# Patient Record
Sex: FEMALE | Race: WHITE | NOT HISPANIC OR LATINO | Employment: FULL TIME | URBAN - METROPOLITAN AREA
[De-identification: names, ages, dates, MRNs, and addresses within clinical notes are randomized per-mention and may not be internally consistent; named-entity substitution may affect disease eponyms.]

---

## 2017-11-24 ENCOUNTER — TELEPHONE (OUTPATIENT)
Dept: FAMILY MEDICINE | Facility: CLINIC | Age: 57
End: 2017-11-24

## 2017-11-24 DIAGNOSIS — Z13.6 CARDIOVASCULAR SCREENING; LDL GOAL LESS THAN 100: ICD-10-CM

## 2017-11-24 DIAGNOSIS — Z13.6 CARDIOVASCULAR SCREENING; LDL GOAL LESS THAN 100: Primary | ICD-10-CM

## 2017-11-24 LAB
CHOLEST SERPL-MCNC: 234 MG/DL
HDLC SERPL-MCNC: 57 MG/DL
LDLC SERPL CALC-MCNC: ABNORMAL MG/DL
LDLC SERPL DIRECT ASSAY-MCNC: 113 MG/DL
NONHDLC SERPL-MCNC: 177 MG/DL
TRIGL SERPL-MCNC: 473 MG/DL

## 2017-11-24 PROCEDURE — 36415 COLL VENOUS BLD VENIPUNCTURE: CPT | Performed by: NURSE PRACTITIONER

## 2017-11-24 PROCEDURE — 83721 ASSAY OF BLOOD LIPOPROTEIN: CPT | Performed by: NURSE PRACTITIONER

## 2017-11-24 PROCEDURE — 80061 LIPID PANEL: CPT | Performed by: NURSE PRACTITIONER

## 2017-11-24 NOTE — TELEPHONE ENCOUNTER
Yodit is requesting an order for fasting cholesterol panel.  She will take the results of this cholesterol panel to her cardiology appointment next week.  She denies any other needs today. She denies the need for any other lab tests.
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
## 2018-02-19 NOTE — PROGRESS NOTES
Per Dr. Dulce Funk send in  Augmentin 875 mg BID x 5 days.  Meds called into the [facility] pharmacy.  Dionne Mike RN

## 2018-10-17 ENCOUNTER — TELEPHONE (OUTPATIENT)
Dept: FAMILY MEDICINE | Facility: CLINIC | Age: 58
End: 2018-10-17

## 2018-12-11 ENCOUNTER — OFFICE VISIT (OUTPATIENT)
Dept: FAMILY MEDICINE | Facility: CLINIC | Age: 58
End: 2018-12-11
Payer: COMMERCIAL

## 2018-12-11 VITALS — HEART RATE: 80 BPM | SYSTOLIC BLOOD PRESSURE: 130 MMHG | RESPIRATION RATE: 16 BRPM | DIASTOLIC BLOOD PRESSURE: 78 MMHG

## 2018-12-11 DIAGNOSIS — M54.41 ACUTE BILATERAL LOW BACK PAIN WITH RIGHT-SIDED SCIATICA: Primary | ICD-10-CM

## 2018-12-11 PROCEDURE — 99203 OFFICE O/P NEW LOW 30 MIN: CPT | Performed by: FAMILY MEDICINE

## 2018-12-11 RX ORDER — LEVOTHYROXINE SODIUM 112 UG/1
TABLET ORAL
Refills: 3 | COMMUNITY
Start: 2018-09-17

## 2018-12-11 RX ORDER — KETOROLAC TROMETHAMINE 30 MG/ML
30-60 INJECTION, SOLUTION INTRAMUSCULAR; INTRAVENOUS
COMMUNITY
Start: 2018-10-08

## 2018-12-11 RX ORDER — ATORVASTATIN CALCIUM 20 MG/1
TABLET, FILM COATED ORAL
Refills: 0 | COMMUNITY
Start: 2018-12-03

## 2018-12-11 RX ORDER — ONDANSETRON 8 MG/1
TABLET, ORALLY DISINTEGRATING ORAL
Refills: 0 | COMMUNITY
Start: 2017-12-18

## 2018-12-11 RX ORDER — BUPROPION HYDROCHLORIDE 150 MG/1
450 TABLET ORAL
COMMUNITY
Start: 2017-09-26

## 2018-12-11 RX ORDER — ZOLMITRIPTAN 5 MG/1
TABLET, FILM COATED ORAL
Refills: 7 | COMMUNITY
Start: 2018-11-16

## 2018-12-11 RX ORDER — FENOFIBRATE 54 MG/1
TABLET ORAL
Refills: 3 | COMMUNITY
Start: 2018-11-14

## 2018-12-11 RX ORDER — SYRINGE WITH NEEDLE, 1 ML 25GX5/8"
SYRINGE, EMPTY DISPOSABLE MISCELLANEOUS
Refills: 1 | COMMUNITY
Start: 2018-09-04

## 2018-12-11 RX ORDER — VENLAFAXINE HYDROCHLORIDE 150 MG/1
CAPSULE, EXTENDED RELEASE ORAL
Refills: 0 | COMMUNITY
Start: 2018-09-17

## 2018-12-11 RX ORDER — CYCLOBENZAPRINE HCL 10 MG
10 TABLET ORAL 3 TIMES DAILY PRN
Qty: 30 TABLET | Refills: 1 | Status: SHIPPED | OUTPATIENT
Start: 2018-12-11 | End: 2018-12-21

## 2018-12-11 RX ORDER — DIHYDROERGOTAMINE MESYLATE 1 MG/ML
1 INJECTION, SOLUTION INTRAMUSCULAR; INTRAVENOUS; SUBCUTANEOUS
COMMUNITY
End: 2021-03-04

## 2018-12-11 RX ORDER — TOPIRAMATE 100 MG/1
TABLET, FILM COATED ORAL
Refills: 6 | COMMUNITY
Start: 2018-11-14

## 2018-12-11 RX ORDER — ESTROGENS, CONJUGATED 0.9 MG
TABLET ORAL
Refills: 0 | COMMUNITY
Start: 2018-10-30 | End: 2021-03-04

## 2018-12-11 ASSESSMENT — PAIN SCALES - GENERAL: PAINLEVEL: MODERATE PAIN (4)

## 2018-12-11 NOTE — PROGRESS NOTES
"  SUBJECTIVE:   Yodit Abraham is a 58 year old female who presents to clinic today for the following health issues:    HPI     Presents with recurrent low back pain which comes and goes.  This past week overnight awoke having tweaked her back in her sleep with increased pain and spasm and radicular symptoms into her RIGHT glute.  No known injury or trauma.  She has not been exercising.  Hx of migraines-- worsened with flared back.  She has been using HEAT and NSAIDs for relief.  Would like referral to PT for core strengthening program.    Problem list and histories reviewed & adjusted, as indicated.  Additional history: as documented        There is no problem list on file for this patient.    History reviewed. No pertinent surgical history.    Social History     Tobacco Use     Smoking status: Never Smoker     Smokeless tobacco: Never Used   Substance Use Topics     Alcohol use: Not on file     History reviewed. No pertinent family history.      Current Outpatient Medications   Medication Sig Dispense Refill     atorvastatin (LIPITOR) 20 MG tablet TK 1 T PO QD  0     BD LUER-CHENTE SYRINGE 25G X 1-1/2\" 3 ML MISC FOR SELF INJECTION OF INTRAMUSCULAR DHE AND KETROLAC  1     buPROPion (WELLBUTRIN XL) 150 MG 24 hr tablet Take 450 mg by mouth       cholecalciferol (VITAMIN D-3 SUPER STRENGTH) 2000 units tablet Take 2,000 Units by mouth       cyclobenzaprine (FLEXERIL) 10 MG tablet Take 1 tablet (10 mg) by mouth 3 times daily as needed for muscle spasms 30 tablet 1     dihydroergotamine (DHE) 1 MG/ML injection Inject 1 mg into the muscle       fenofibrate (LOFIBRA) 54 MG tablet TK 1 T PO D  3     ketorolac (TORADOL) 60 MG/2ML injection Inject 30-60 mg into the muscle       levothyroxine (SYNTHROID/LEVOTHROID) 112 MCG tablet TK 1 T PO D  3     ondansetron (ZOFRAN-ODT) 8 MG ODT tab TK 1 T PO SUBLINGUALLY BID PRN  0     PREMARIN 0.9 MG tablet TAKE ONE T PO QD  0     Syringe Luer Slip (MONOJECT SYRINGE REG LUER) 3 ML MISC For self " "injection of IM DHE and Ketorolac. Needs 1 1/2\" 25G needles.       topiramate (TOPAMAX) 100 MG tablet TK 1 T PO D HS  6     tretinoin (RENOVA) 0.02 % external cream        venlafaxine (EFFEXOR-XR) 150 MG 24 hr capsule TK 1 C PO QD  0     ZOLMitriptan (ZOMIG) 5 MG tablet TK 1 T PO AT ONSET OF MIGRAINE. MAY REPEAT IN 2 H PRN FOR UP TO 2 TS IN 24 H AND MAX 4 DAYS PER MONTH  7     ATENOLOL 50 MG OR TABS 1 TABLET DAILY (Patient not taking: Reported on 12/11/2018) 30 5     No Known Allergies  Recent Labs   Lab Test 11/24/17  1134   LDL Cannot estimate LDL when triglyceride exceeds 400 mg/dL  113*   HDL 57   TRIG 473*      BP Readings from Last 3 Encounters:   12/11/18 130/78    Wt Readings from Last 3 Encounters:   No data found for Wt                    ROS:  Constitutional, HEENT, cardiovascular, pulmonary, gi and gu systems are negative, except as otherwise noted.    OBJECTIVE:     /78   Pulse 80   Resp 16   LMP  (LMP Unknown)   Breastfeeding? No     GENERAL: healthy, alert and no distress  EYES: Eyes grossly normal to inspection, PERRL and conjunctivae and sclerae normal  NECK: no adenopathy, no asymmetry, masses, or scars and thyroid normal to palpation  MS: no gross musculoskeletal defects noted, no edema, pain localizes to low back with radicular pain into RIGHT buttock  SKIN: no suspicious lesions or rashes  NEURO: Normal strength and tone, mentation intact and speech normal  PSYCH: mentation appears normal, affect normal/bright    ASSESSMENT/PLAN:     1. Acute bilateral low back pain with right-sided sciatica    - PHYSICAL THERAPY REFERRAL; Future  - cyclobenzaprine (FLEXERIL) 10 MG tablet; Take 1 tablet (10 mg) by mouth 3 times daily as needed for muscle spasms  Dispense: 30 tablet; Refill: 1    Recommend continued HEAT to area/NSAIDs prn pain/FLEXERIL prn spasms with referral to PT for core strengthening exercises to prevent recurrence.    Francine Funk MD  [facility]  "

## 2018-12-12 ENCOUNTER — THERAPY VISIT (OUTPATIENT)
Dept: PHYSICAL THERAPY | Facility: CLINIC | Age: 58
End: 2018-12-12
Payer: COMMERCIAL

## 2018-12-12 DIAGNOSIS — M54.9 BACK PAIN: Primary | ICD-10-CM

## 2018-12-12 PROCEDURE — 97110 THERAPEUTIC EXERCISES: CPT | Mod: GP | Performed by: PHYSICAL THERAPIST

## 2018-12-12 PROCEDURE — 97161 PT EVAL LOW COMPLEX 20 MIN: CPT | Mod: GP | Performed by: PHYSICAL THERAPIST

## 2018-12-12 NOTE — PROGRESS NOTES
[facility] Initial Evaluation  Subjective:  Pt presents to PT with c/o LBP with insidious 6-8 weeks ago.      Pt works as an RN [employer].   Pt reports she is currently sedentary and reports weakness in core muscles.    PMH: HTN, Severe headaches, thyroid problems        Yodit Abraham is a 58 year old female with a lumbar condition.  Condition occurred with:  Insidious onset.  Condition occurred: for unknown reasons.  This is a new condition     Patient reports pain:  Central lumbar spine.  Radiates to: left lateral hip.  Pain is described as aching and sharp  and reported as 2/10.  Associated symptoms:  Loss of strength. Pain is the same all the time.  Exacerbated by: standing, sitting, walking, lifting. and relieved by heat and rest.  Since onset symptoms are gradually worsening.  Special testing: none.  Previous treatment: none.  Improvement with previous treatment: n/a.  General health as reported by patient is good.                                              Objective:  System         Lumbar/SI Evaluation  ROM:    AROM Lumbar:   Flexion:            100%, +  Ext:                    50%, ++   Side Bend:        Left:  100%, ++    Right:  100%, +  Rotation:           Left:  100%, ++    Right:  100%, +  Side Glide:        Left:     Right:         Strength: ASLR mild loss of control.  SL Bridge increased L pelvic drop.    Lumbar Myotomes:  Lumbar myotomes: WFL.            Lumbar DTR's:  not assessed        Lumbar Dermtomes:  not assessed                Neural Tension/Mobility:  Lumbar:  Not assessed        Lumbar Palpation:  Palpation (lumbar): TTP B paraspinal mm.        Functional Tests:  Core strength and proprioception lumbar: Squat feels weakness in legs.  SLS unsteadiness B.   SL Squat  sig valgus B.          Lumbar Provocation:  normal                                          Hip Evaluation    Hip Strength:      Extension:  Left: 4/5  Pain:Right: 4/5    Pain:    Abduction:  Left: 4/5      Pain:Right: 4/5    Pain:                                     General Evaluation:                        Posture:  normal          Gait:  Gait wnl general: Pt amb WNL.  Pain in L lateral hip.                                         ROS    Assessment/Plan:    Patient is a 58 year old female with lumbar complaints.    Patient has the following significant findings with corresponding treatment plan.                Diagnosis 1:  LBP  Pain -  hot/cold therapy  Decreased strength - therapeutic exercise and therapeutic activities  Impaired balance - neuro re-education and therapeutic activities  Decreased proprioception - neuro re-education and therapeutic activities  Impaired muscle performance - neuro re-education  Decreased function - therapeutic activities    Therapy Evaluation Codes:   1) History comprised of:   Personal factors that impact the plan of care:      None.    Comorbidity factors that impact the plan of care are:      None.     Medications impacting care: None.  2) Examination of Body Systems comprised of:   Body structures and functions that impact the plan of care:      Lumbar spine.   Activity limitations that impact the plan of care are:      Lifting, Sitting, Squatting/kneeling, Standing and Walking.  3) Clinical presentation characteristics are:   Stable/Uncomplicated.  4) Decision-Making    Low complexity using standardized patient assessment instrument and/or measureable assessment of functional outcome.  Cumulative Therapy Evaluation is: Low complexity.    Previous and current functional limitations:  (See Goal Flow Sheet for this information)    Short term and Long term goals: (See Goal Flow Sheet for this information)     Communication ability:  Patient appears to be able to clearly communicate and understand verbal and written communication and follow directions correctly.  Treatment Explanation - The following has been discussed with the patient:   RX ordered/plan of care  Anticipated  outcomes  Possible risks and side effects  This patient would benefit from PT intervention to resume normal activities.   Rehab potential is excellent.    Frequency:  1 X week, once daily  Duration:  for 4-6 weeks  Discharge Plan:  Achieve all LTG.  Independent in home treatment program.  Return to previous functional level by discharge.  Reach maximal therapeutic benefit.    Please refer to the daily flowsheet for treatment today, total treatment time and time spent performing 1:1 timed codes.

## 2019-01-07 ENCOUNTER — ALLIED HEALTH/NURSE VISIT (OUTPATIENT)
Dept: NURSING | Facility: CLINIC | Age: 59
End: 2019-01-07
Payer: COMMERCIAL

## 2019-01-07 DIAGNOSIS — Z23 ENCOUNTER FOR IMMUNIZATION: Primary | ICD-10-CM

## 2019-01-07 PROCEDURE — 90750 HZV VACC RECOMBINANT IM: CPT

## 2019-01-07 PROCEDURE — 99207 ZZC NO CHARGE NURSE ONLY: CPT

## 2019-01-07 PROCEDURE — 90471 IMMUNIZATION ADMIN: CPT

## 2019-01-07 NOTE — NURSING NOTE
Screening Questionnaire for Adult Immunization    Are you sick today?   No   Do you have allergies to medications, food, a vaccine component or latex?   No   Have you ever had a serious reaction after receiving a vaccination?   No   Do you have a long-term health problem with heart disease, lung disease, asthma, kidney disease, metabolic disease (e.g. diabetes), anemia, or other blood disorder?   No   Do you have cancer, leukemia, HIV/AIDS, or any other immune system problem?   No   In the past 3 months, have you taken medications that affect  your immune system, such as prednisone, other steroids, or anticancer drugs; drugs for the treatment of rheumatoid arthritis, Crohn s disease, or psoriasis; or have you had radiation treatments?   No   Have you had a seizure, or a brain or other nervous system problem?   No   During the past year, have you received a transfusion of blood or blood     products, or been given immune (gamma) globulin or antiviral drug?   No   For women: Are you pregnant or is there a chance you could become        pregnant during the next month?   No   Have you received any vaccinations in the past 4 weeks?   No     Immunization questionnaire answers were all negative.        Per orders of Dr. Seaman, injection of Shingrix given by Shelbie Fisher. Patient instructed to remain in clinic for 15 minutes afterwards, and to report any adverse reaction to me immediately.    Due to injection administration, patient instructed to remain in clinic for 15 minutes  afterwards, and to report any adverse reaction to me immediately.       Screening performed by Shelbie Fisher on 1/7/2019 at 5:21 PM.

## 2019-02-15 DIAGNOSIS — G43.911 INTRACTABLE MIGRAINE WITH STATUS MIGRAINOSUS, UNSPECIFIED MIGRAINE TYPE: Primary | ICD-10-CM

## 2019-02-15 RX ORDER — OXYCODONE HYDROCHLORIDE 5 MG/1
5 TABLET ORAL EVERY 6 HOURS PRN
Qty: 15 TABLET | Refills: 0 | Status: SHIPPED | OUTPATIENT
Start: 2019-02-15 | End: 2019-02-18

## 2019-05-28 ENCOUNTER — ALLIED HEALTH/NURSE VISIT (OUTPATIENT)
Dept: NURSING | Facility: CLINIC | Age: 59
End: 2019-05-28
Payer: COMMERCIAL

## 2019-05-28 DIAGNOSIS — Z23 NEED FOR VACCINATION: Primary | ICD-10-CM

## 2019-05-28 PROCEDURE — 90750 HZV VACC RECOMBINANT IM: CPT

## 2019-05-28 PROCEDURE — 99207 ZZC NO CHARGE NURSE ONLY: CPT

## 2019-05-28 PROCEDURE — 90471 IMMUNIZATION ADMIN: CPT

## 2019-09-26 DIAGNOSIS — G43.811 OTHER MIGRAINE WITH STATUS MIGRAINOSUS, INTRACTABLE: Primary | ICD-10-CM

## 2019-09-26 RX ORDER — OXYCODONE HYDROCHLORIDE 5 MG/1
5 TABLET ORAL EVERY 6 HOURS PRN
Qty: 30 TABLET | Refills: 0 | Status: SHIPPED | OUTPATIENT
Start: 2019-09-26 | End: 2019-12-23

## 2019-12-23 DIAGNOSIS — G43.811 OTHER MIGRAINE WITH STATUS MIGRAINOSUS, INTRACTABLE: ICD-10-CM

## 2019-12-23 RX ORDER — OXYCODONE HYDROCHLORIDE 5 MG/1
5 TABLET ORAL EVERY 6 HOURS PRN
Qty: 30 TABLET | Refills: 0 | Status: SHIPPED | OUTPATIENT
Start: 2019-12-23 | End: 2020-02-14

## 2019-12-24 ENCOUNTER — TELEPHONE (OUTPATIENT)
Dept: FAMILY MEDICINE | Facility: CLINIC | Age: 59
End: 2019-12-24

## 2019-12-24 DIAGNOSIS — H60.339: Primary | ICD-10-CM

## 2019-12-24 RX ORDER — NEOMYCIN SULFATE, POLYMYXIN B SULFATE AND HYDROCORTISONE 10; 3.5; 1 MG/ML; MG/ML; [USP'U]/ML
SUSPENSION/ DROPS AURICULAR (OTIC)
Qty: 10 ML | Refills: 0 | Status: SHIPPED | OUTPATIENT
Start: 2019-12-24 | End: 2021-02-23

## 2020-02-14 DIAGNOSIS — G43.811 OTHER MIGRAINE WITH STATUS MIGRAINOSUS, INTRACTABLE: ICD-10-CM

## 2020-02-14 RX ORDER — OXYCODONE HYDROCHLORIDE 5 MG/1
5 TABLET ORAL EVERY 6 HOURS PRN
Qty: 30 TABLET | Refills: 0 | Status: SHIPPED | OUTPATIENT
Start: 2020-02-14 | End: 2020-04-13

## 2020-04-13 DIAGNOSIS — G43.811 OTHER MIGRAINE WITH STATUS MIGRAINOSUS, INTRACTABLE: ICD-10-CM

## 2020-04-13 RX ORDER — OXYCODONE HYDROCHLORIDE 5 MG/1
5 TABLET ORAL EVERY 6 HOURS PRN
Qty: 30 TABLET | Refills: 0 | Status: SHIPPED | OUTPATIENT
Start: 2020-04-13 | End: 2020-08-07

## 2020-04-17 ENCOUNTER — TELEPHONE (OUTPATIENT)
Dept: PALLIATIVE MEDICINE | Facility: CLINIC | Age: 60
End: 2020-04-17

## 2020-04-17 ENCOUNTER — VIRTUAL VISIT (OUTPATIENT)
Dept: FAMILY MEDICINE | Facility: CLINIC | Age: 60
End: 2020-04-17
Payer: COMMERCIAL

## 2020-04-17 DIAGNOSIS — G43.811 OTHER MIGRAINE WITH STATUS MIGRAINOSUS, INTRACTABLE: Primary | ICD-10-CM

## 2020-04-17 DIAGNOSIS — G43.809 OTHER MIGRAINE WITHOUT STATUS MIGRAINOSUS, NOT INTRACTABLE: Primary | ICD-10-CM

## 2020-04-17 PROCEDURE — 99213 OFFICE O/P EST LOW 20 MIN: CPT | Mod: GT | Performed by: FAMILY MEDICINE

## 2020-04-17 NOTE — TELEPHONE ENCOUNTER
Essential, ok to schedule.     Botox order placed for centralized PA team.      DO [facility]

## 2020-04-17 NOTE — TELEPHONE ENCOUNTER
Order received from Francine Funk MD.      Patient is being referred for Procedure Order Botox - Chronic migraine.      Patients diagnosis is Migraine HA .      Routing to interventionists pool to determine if this is essential.     Larisa LOPEZ    [facility]

## 2020-04-17 NOTE — PROGRESS NOTES
"Yodit Abraham is a 59 year old female who is being evaluated via a billable video visit.      The patient has been notified of following:     \"This video visit will be conducted via a call between you and your physician/provider. We have found that certain health care needs can be provided without the need for an in-person physical exam.  This service lets us provide the care you need with a video conversation.  If a prescription is necessary we can send it directly to your pharmacy.  If lab work is needed we can place an order for that and you can then stop by our lab to have the test done at a later time.    Video visits are billed at different rates depending on your insurance coverage.  Please reach out to your insurance provider with any questions.    If during the course of the call the physician/provider feels a video visit is not appropriate, you will not be charged for this service.\"    Patient has given verbal consent for Video visit? Yes    How would you like to obtain your AVS? MyChart  text  Patient would like the video invitation sent by:     Subjective     Yodit Abraham is a 59 year old female who presents to clinic today for the following health issues:    Hx of intractable migraine HAs.  Needs new referral with change of insurance from a  provider to continue with Botox injections.  Last injections done at [facility].  Currently with increased migraine symptoms.      Video Start Time: 1020a    There is no problem list on file for this patient.    No past surgical history on file.    Social History     Tobacco Use     Smoking status: Never Smoker     Smokeless tobacco: Never Used   Substance Use Topics     Alcohol use: Not on file     No family history on file.      Current Outpatient Medications   Medication Sig Dispense Refill     ATENOLOL 50 MG OR TABS 1 TABLET DAILY (Patient not taking: Reported on 12/11/2018) 30 5     atorvastatin (LIPITOR) 20 MG tablet TK 1 T PO QD  0     BD LUER-CHENTE SYRINGE " "25G X 1-1/2\" 3 ML MISC FOR SELF INJECTION OF INTRAMUSCULAR DHE AND KETROLAC  1     buPROPion (WELLBUTRIN XL) 150 MG 24 hr tablet Take 450 mg by mouth       cholecalciferol (VITAMIN D-3 SUPER STRENGTH) 2000 units tablet Take 2,000 Units by mouth       dihydroergotamine (DHE) 1 MG/ML injection Inject 1 mg into the muscle       fenofibrate (LOFIBRA) 54 MG tablet TK 1 T PO D  3     ketorolac (TORADOL) 60 MG/2ML injection Inject 30-60 mg into the muscle       levothyroxine (SYNTHROID/LEVOTHROID) 112 MCG tablet TK 1 T PO D  3     neomycin-polymyxin-hydrocortisone (CORTISPORIN) 3.5-03121-4 otic suspension 3 drops in infected ear 4 times daily 10 mL 0     ondansetron (ZOFRAN-ODT) 8 MG ODT tab TK 1 T PO SUBLINGUALLY BID PRN  0     oxyCODONE (ROXICODONE) 5 MG tablet Take 1 tablet (5 mg) by mouth every 6 hours as needed for severe pain (secondary to migraine HAs) 30 tablet 0     PREMARIN 0.9 MG tablet TAKE ONE T PO QD  0     Syringe Luer Slip (MONOJECT SYRINGE REG LUER) 3 ML MISC For self injection of IM DHE and Ketorolac. Needs 1 1/2\" 25G needles.       topiramate (TOPAMAX) 100 MG tablet TK 1 T PO D HS  6     tretinoin (RENOVA) 0.02 % external cream        venlafaxine (EFFEXOR-XR) 150 MG 24 hr capsule TK 1 C PO QD  0     ZOLMitriptan (ZOMIG) 5 MG tablet TK 1 T PO AT ONSET OF MIGRAINE. MAY REPEAT IN 2 H PRN FOR UP TO 2 TS IN 24 H AND MAX 4 DAYS PER MONTH  7     No Known Allergies  Recent Labs   Lab Test 11/24/17  1134   LDL Cannot estimate LDL when triglyceride exceeds 400 mg/dL  113*   HDL 57   TRIG 473*      BP Readings from Last 3 Encounters:   12/11/18 130/78    Wt Readings from Last 3 Encounters:   No data found for Wt                    Reviewed and updated as needed this visit by Provider         Review of Systems   ROS COMP: Constitutional, HEENT, cardiovascular, pulmonary, gi and gu systems are negative, except as otherwise noted.      Objective    LMP  (LMP Unknown)   There is no height or weight on file to " calculate BMI.  Physical Exam   GENERAL: healthy, alert in mild distress- appears fatigued  EYES: Eyes grossly normal to inspection, PERRL and conjunctivae and sclerae normal  NECK: no asymmetry or masses  SKIN: no suspicious lesions or rashes  NEURO: Normal strength and tone, mentation intact and speech normal  PSYCH: mentation appears normal, affect normal/bright            Assessment & Plan     1. Other migraine with status migrainosus, intractable    - PAIN MANAGEMENT REFERRAL     New order for Botox injections-- previous order and encounter as noted via Care Everywhere copy and pasted below.  Franklin Romero MD - 02/14/2019 8:35 AM CST  REFERRING PROVIDER:  Yulissa Chavis APRN, CNP    SUBJECTIVE:   200 units OnabotulinumtoxinA (Botox) for chronic migraine. Informed written consent is obtained. ABN is signed. Potential complications and need to call for concerns included in counseling. See Epic Med List. Last treatment 07/12/2018. Reports ongoing benefit, decrease in overall headache burden by more than 50%.       OBJECTIVE: 200 units OnabotulinumtoxinA (Botox) is reconstituted with 4 ml preservative free normal saline with a final concentration of 5 Units per 0.1 ml. 50 units is injected per 30G one half inch dermatologic needle. All areas were prepped with alcohol wipes. All anterior to the ear injections were performed supine while posterior muscles injected in the sitting position. Pause for the accuracy of dosing performed before beginning.       MUSCLES:   Procerus: 10 units in a single injection.   : 10 units total, 5 units per site.   Frontalis: 20 units total; divided in 4 sites.   Temporalis: 60 units total; divided in 8 sites.   Masseter: 10 units total; divided in 2 sites.   Occipitalis: 40 units total; divided in 6 sites.   Cervical paraspinals: 20 units total; divided in 4 sites.   Trapezius: 30 units total; divided in 6 sites.       Successful completion of 200 units  OnabotulinumtoxinA (Botox) divided into 33 specific injection sites without evidence complication or significant intolerance.       ASSESSMENT: Diagnosis: Chronic intractable migraine.       PLAN: Follow-up for next injection series in 12 weeks based on patient reported needs.    Electronically signed by Franklin Romero MD at 02/14/2019 8:57 AM CST          No follow-ups on file.    Francine Funk MD  [facility]      Video-Visit Details    Type of service:  Video Visit    Video End Time (time video stopped): 1030a    Originating Location (pt. Location): Home    Distant Location (provider location):  home    Mode of Communication:  Video Conference via [email or web address]        Francine Funk MD

## 2020-04-30 ENCOUNTER — OFFICE VISIT (OUTPATIENT)
Dept: PALLIATIVE MEDICINE | Facility: CLINIC | Age: 60
End: 2020-04-30
Attending: FAMILY MEDICINE
Payer: COMMERCIAL

## 2020-04-30 VITALS — HEART RATE: 87 BPM | SYSTOLIC BLOOD PRESSURE: 135 MMHG | OXYGEN SATURATION: 100 % | DIASTOLIC BLOOD PRESSURE: 84 MMHG

## 2020-04-30 DIAGNOSIS — G43.809 OTHER MIGRAINE WITHOUT STATUS MIGRAINOSUS, NOT INTRACTABLE: Primary | ICD-10-CM

## 2020-04-30 PROCEDURE — 64615 CHEMODENERV MUSC MIGRAINE: CPT | Performed by: ANESTHESIOLOGY

## 2020-04-30 ASSESSMENT — PAIN SCALES - GENERAL: PAINLEVEL: NO PAIN (0)

## 2020-04-30 NOTE — PATIENT INSTRUCTIONS
[facility]   Botox Injection Discharge Instructions    Do not rub or put extended pressure on the injection sites. You may gently touch the sites to remove any excess blood.    Monitor the injection sites for signs and symptoms of infection such as redness, swelling, warmth, fever, chills, or drainage to areas.    You may have soreness at the injection sites for up to 24 hours.    If you are able to use anti-inflammatory medications or Tylenol for pain control, you can take these as directed.    It may take up to 1 month to notice benefit from the 1st treatment    If you do notice relief, botox can be done every 3 months.  It may require insurance authorization everytime.      For questions about insurance coverage, please call the main clinic number and ask to speak with someone about botox coverage.     Pain Clinic phone number during work hours Monday-Friday:  [phone]    After hours provider line: [phone]

## 2020-04-30 NOTE — PROGRESS NOTES
Procedure note for Botox:  Pre procedure Diagnosis: Chronic Migraine     Post procedure Diagnosis: Same  Procedure performed: Botox Injections  Anesthesia: none  Complications: none  Operators: Tomeka Veronica MD    Indications:    Yodit Abraham is a 59 year old female who presents to clinic today for botox injections. She was referred by her PCP.  She was previously getting botox injections at [facility] but has changed her insurance to [place].  She has a history of chronic migraine headaches, more than 14 days/month.  Exam shows tenderness over the occipital and temporal muscles and they have tried conservative treatment including multiple headache medications and physical therapy.     Options/alternatives, benefits and risks were discussed with the patient including bleeding, infection, pneumothorax, weakness, and headache flare.   Questions were answered and she agrees to proceed. Voluntary informed consent was obtained and signed.     Response to previous Botox treatment:   Last Botox Date: 12/10/2019 Dr. Romero -  neurology clinic  Total Unit: 155U    1. Headache frequency: 15 headache days per month. This is compared to his baseline headache frequency of 3 headache days per month.      2. Headache duration during this injection cycle: Headache duration has decreased.  Previously headaches lasted 48 hours and now they are average 12-24 hours.      3. Headache intensity during this injection cycle:    7/10 = Typical pain level   8/10 = Worst pain level   0/10 = Lowest pain level     4. Change in headache medication usage: Emgality, Zomig 5mg PRN, Topamax 100mg at bedtime, Effexor 150mg at bedtime, Wellbutrin 450mg daily     5. ER Visits During This Injection Cycle: NONE     6. Functional Performance: Change in ADL's, social interaction, days lost from work, etc. Patient reports being able to more fully participate in social and family activities and responsibilities as headache symptoms have  improved.    Vitals were reviewed: Yes  Allergies were reviewed:  Yes    Medications were reviewed:  Yes       Procedure:  After getting informed consent, a Pause for the Cause was performed.  Patient was prepped and draped with chloroprep.    A 27 gauge needle was used to make the injections.  After negative aspiration, botox was injected bilaterally into the following locations:    Procerus- 5 units (1 site)  Frontals- 20 units (4 sites)   -10 units (2 sites)  Temporalis- 40 units (8 sites)  Occipitis- 30 units (6 sites)  Cervical paraspinals- 20 units (4 sites).  Upper Trapezius- 30 units (6 sites)           Hemostasis was achieved.    Total units used: 155  Total units wasted: 45  Botox lot numbers and Expiration dates:  [id number]  8/2022      Bandaids were placed when appropriate.  The patient tolerated the procedure well.    Follow-up includes:    -f/u phone call in one week  -can be repeated after 3 full months have passed.      ANNABEL ZEPEDA MD   Pain Management & Addiction Medicine

## 2020-05-04 DIAGNOSIS — G43.811 OTHER MIGRAINE WITH STATUS MIGRAINOSUS, INTRACTABLE: Primary | ICD-10-CM

## 2020-05-04 RX ORDER — PREDNISONE 20 MG/1
TABLET ORAL
Qty: 20 TABLET | Refills: 0 | Status: SHIPPED | OUTPATIENT
Start: 2020-05-04 | End: 2020-05-05

## 2020-05-05 DIAGNOSIS — G43.811 OTHER MIGRAINE WITH STATUS MIGRAINOSUS, INTRACTABLE: ICD-10-CM

## 2020-05-05 RX ORDER — PREDNISONE 20 MG/1
TABLET ORAL
Qty: 20 TABLET | Refills: 0 | Status: SHIPPED | OUTPATIENT
Start: 2020-05-05 | End: 2021-02-23

## 2020-08-06 ENCOUNTER — OFFICE VISIT (OUTPATIENT)
Dept: PALLIATIVE MEDICINE | Facility: CLINIC | Age: 60
End: 2020-08-06
Payer: COMMERCIAL

## 2020-08-06 VITALS — OXYGEN SATURATION: 100 % | HEART RATE: 79 BPM | DIASTOLIC BLOOD PRESSURE: 75 MMHG | SYSTOLIC BLOOD PRESSURE: 109 MMHG

## 2020-08-06 DIAGNOSIS — G43.809 OTHER MIGRAINE WITHOUT STATUS MIGRAINOSUS, NOT INTRACTABLE: Primary | ICD-10-CM

## 2020-08-06 PROCEDURE — 64615 CHEMODENERV MUSC MIGRAINE: CPT | Performed by: ANESTHESIOLOGY

## 2020-08-06 ASSESSMENT — PAIN SCALES - GENERAL: PAINLEVEL: MODERATE PAIN (4)

## 2020-08-06 NOTE — PROGRESS NOTES
Procedure note for Botox:  Pre procedure Diagnosis: Chronic Migraine     Post procedure Diagnosis: Same  Procedure performed: Botox Injections  Anesthesia: none  Complications: none  Operators: Tomeka Veronica MD    Indications:    Yodit Abraham is a 59 year old female who presents to clinic today for botox injections. She was referred by her PCP.  She was previously getting botox injections at [facility] but has changed her insurance to [place].  She has a history of chronic migraine headaches, more than 14 days/month.  Exam shows tenderness over the occipital and temporal muscles and they have tried conservative treatment including multiple headache medications and physical therapy.     Options/alternatives, benefits and risks were discussed with the patient including bleeding, infection, pneumothorax, weakness, and headache flare.   Questions were answered and she agrees to proceed. Voluntary informed consent was obtained and signed.     Response to previous Botox treatment:   Last Botox Date: 12/10/2019 Dr. Romero -  neurology clinic  Total Unit: 155U    1. Headache frequency: Without botox she has a headache frequency of 15 headache days per month. This is compared to 3 headache days per month with botox injections.      2. Headache duration during this injection cycle: Headache duration has decreased.  Previously headaches lasted 48 hours and now they are average 12-24 hours.      3. Headache intensity during this injection cycle:    7/10 = Typical pain level   8/10 = Worst pain level   0/10 = Lowest pain level     4. Change in headache medication usage: Emgality, Zomig 5mg PRN, Topamax 100mg at bedtime, Effexor 150mg at bedtime, Wellbutrin 450mg daily     5. ER Visits During This Injection Cycle: NONE     6. Functional Performance: Change in ADL's, social interaction, days lost from work, etc. Patient reports being able to more fully participate in social and family activities and responsibilities as  headache symptoms have improved.    Vitals were reviewed: Yes  Allergies were reviewed:  Yes    Medications were reviewed:  Yes       Procedure:  After getting informed consent, a Pause for the Cause was performed.  Patient was prepped and draped with chloroprep.    A 27 gauge needle was used to make the injections.  After negative aspiration, botox was injected bilaterally into the following locations:    Procerus- 5 units (1 site)  Frontals- 20 units (4 sites)   -10 units (2 sites)  Temporalis- 40 units (8 sites)  Occipitis- 30 units (6 sites)  Cervical paraspinals- 20 units (4 sites).  Upper Trapezius- 30 units (6 sites)           Hemostasis was achieved.    Total units used: 155  Total units wasted: 45  Botox lot numbers and Expiration dates:  [id number]  EXP 3/2023      Bandaids were placed when appropriate.  The patient tolerated the procedure well.    Follow-up includes:    -f/u phone call in one week  -can be repeated after 3 full months have passed.      ANNABEL ZEPEDA MD   Pain Management & Addiction Medicine

## 2020-08-07 DIAGNOSIS — G43.811 OTHER MIGRAINE WITH STATUS MIGRAINOSUS, INTRACTABLE: ICD-10-CM

## 2020-08-07 RX ORDER — OXYCODONE HYDROCHLORIDE 5 MG/1
5 TABLET ORAL EVERY 6 HOURS PRN
Qty: 30 TABLET | Refills: 0 | Status: SHIPPED | OUTPATIENT
Start: 2020-08-07 | End: 2020-09-05

## 2020-09-05 DIAGNOSIS — G43.811 OTHER MIGRAINE WITH STATUS MIGRAINOSUS, INTRACTABLE: ICD-10-CM

## 2020-09-05 RX ORDER — OXYCODONE HYDROCHLORIDE 5 MG/1
5 TABLET ORAL EVERY 6 HOURS PRN
Qty: 30 TABLET | Refills: 0 | Status: SHIPPED | OUTPATIENT
Start: 2020-09-05 | End: 2020-11-09

## 2020-09-05 RX ORDER — PREDNISONE 20 MG/1
TABLET ORAL
Qty: 20 TABLET | Refills: 0 | Status: SHIPPED | OUTPATIENT
Start: 2020-09-05 | End: 2020-11-09

## 2020-10-02 DIAGNOSIS — R42 VERTIGO: Primary | ICD-10-CM

## 2020-10-02 RX ORDER — MECLIZINE HYDROCHLORIDE 25 MG/1
TABLET ORAL
Qty: 21 TABLET | Refills: 1 | Status: SHIPPED | OUTPATIENT
Start: 2020-10-02 | End: 2021-12-27

## 2020-11-09 ENCOUNTER — E-VISIT (OUTPATIENT)
Dept: FAMILY MEDICINE | Facility: CLINIC | Age: 60
End: 2020-11-09
Payer: COMMERCIAL

## 2020-11-09 DIAGNOSIS — G43.811 OTHER MIGRAINE WITH STATUS MIGRAINOSUS, INTRACTABLE: ICD-10-CM

## 2020-11-09 PROCEDURE — 99421 OL DIG E/M SVC 5-10 MIN: CPT | Performed by: FAMILY MEDICINE

## 2020-11-09 RX ORDER — PREDNISONE 20 MG/1
TABLET ORAL
Qty: 20 TABLET | Refills: 0 | Status: SHIPPED | OUTPATIENT
Start: 2020-11-09 | End: 2020-12-23

## 2020-11-09 RX ORDER — OXYCODONE HYDROCHLORIDE 5 MG/1
5 TABLET ORAL EVERY 6 HOURS PRN
Qty: 30 TABLET | Refills: 0 | Status: SHIPPED | OUTPATIENT
Start: 2020-11-09 | End: 2020-12-23

## 2020-11-09 NOTE — PATIENT INSTRUCTIONS
Thank you for choosing us for your care. I have placed an order for a prescription so that you can start treatment. View your full visit summary for details by clicking on the link below. Your pharmacist will able to address any questions you may have about the medication.     If you're not feeling better within 5-7 days, please schedule an appointment.  You can schedule an appointment right here in [facility], or call [phone]  If the visit is for the same symptoms as your e-visit, we'll refund the cost of your e-visit if seen within seven days.

## 2020-11-12 ENCOUNTER — OFFICE VISIT (OUTPATIENT)
Dept: PALLIATIVE MEDICINE | Facility: CLINIC | Age: 60
End: 2020-11-12
Payer: COMMERCIAL

## 2020-11-12 VITALS — OXYGEN SATURATION: 99 % | DIASTOLIC BLOOD PRESSURE: 83 MMHG | SYSTOLIC BLOOD PRESSURE: 144 MMHG | HEART RATE: 79 BPM

## 2020-11-12 DIAGNOSIS — G43.809 OTHER MIGRAINE WITHOUT STATUS MIGRAINOSUS, NOT INTRACTABLE: Primary | ICD-10-CM

## 2020-11-12 PROCEDURE — 64615 CHEMODENERV MUSC MIGRAINE: CPT | Performed by: ANESTHESIOLOGY

## 2020-11-12 ASSESSMENT — PAIN SCALES - GENERAL: PAINLEVEL: NO PAIN (0)

## 2020-11-12 NOTE — PROGRESS NOTES
Procedure note for Botox:  Pre procedure Diagnosis: Chronic Migraine     Post procedure Diagnosis: Same  Procedure performed: Botox Injections  Anesthesia: none  Complications: none  Operators: Tomeka Veronica MD    Indications:    Yodit Abraham is a 59 year old female who presents to clinic today for botox injections. She was referred by her PCP.  She was previously getting botox injections at [facility] but has changed her insurance to [place].  She has a history of chronic migraine headaches, more than 14 days/month.  Exam shows tenderness over the occipital and temporal muscles and they have tried conservative treatment including multiple headache medications and physical therapy.     Options/alternatives, benefits and risks were discussed with the patient including bleeding, infection, pneumothorax, weakness, and headache flare.   Questions were answered and she agrees to proceed. Voluntary informed consent was obtained and signed.     Response to previous Botox treatment:   Last Botox Date: 12/10/2019 Dr. Romero -  neurology clinic  Total Unit: 155U    1. Headache frequency: Without botox she has a headache frequency of 15 headache days per month. This is compared to 3 headache days per month with botox injections.      2. Headache duration during this injection cycle: Headache duration has decreased.  Previously headaches lasted 48 hours and now they are average 12-24 hours.      3. Headache intensity during this injection cycle:    7/10 = Typical pain level   8/10 = Worst pain level   0/10 = Lowest pain level     4. Change in headache medication usage: Emgality, Zomig 5mg PRN, Topamax 100mg at bedtime, Effexor 150mg at bedtime, Wellbutrin 450mg daily     5. ER Visits During This Injection Cycle: NONE     6. Functional Performance: Change in ADL's, social interaction, days lost from work, etc. Patient reports being able to more fully participate in social and family activities and responsibilities as  headache symptoms have improved.    Vitals were reviewed: Yes  Allergies were reviewed:  Yes    Medications were reviewed:  Yes       Procedure:  After getting informed consent, a Pause for the Cause was performed.  Patient was prepped and draped with chloroprep.    A 27 gauge needle was used to make the injections.  After negative aspiration, botox was injected bilaterally into the following locations:    Procerus- 5 units (1 site)  Frontals- 20 units (4 sites)   -10 units (2 sites)  Temporalis- 40 units (8 sites)  Occipitis- 30 units (6 sites)  Cervical paraspinals- 20 units (4 sites).  Upper Trapezius- 30 units (6 sites)     Bilateral TMJ - 40 units (2 sites)          Hemostasis was achieved.    Total units used: 195  Total units wasted: 5  Botox lot numbers and Expiration dates:  [id number]  EXP 7/2023      Bandaids were placed when appropriate.  The patient tolerated the procedure well.    Follow-up includes:    -f/u phone call in one week  -can be repeated after 3 full months have passed.      ANNABEL ZEPEDA MD   Pain Management & Addiction Medicine

## 2020-11-12 NOTE — PATIENT INSTRUCTIONS
----------------------------------------------------------------  [facility] Number:  [phone]     Call with any questions about your care and for scheduling assistance.     Calls are returned Monday through Friday between 8 AM and 4:30 PM. We usually get back to you within 2 business days depending on the issue/request.    If we are prescribing your medications:    For opioid medication refills, call the clinic or send a Qudini message 7 days in advance.  Please include:    Name of requested medication    Name of the pharmacy.    For non-opioid medications, call your pharmacy directly to request a refill. Please allow 3-4 days to be processed.     Per [place] State Law:    All controlled substance prescriptions must be filled within 30 days of being written.      For those controlled substances allowing refills, pickup must occur within 30 days of last fill.      We believe regular attendance is key to your success in our program!      Any time you are unable to keep your appointment we ask that you call us at least 24 hours in advance to cancel.This will allow us to offer the appointment time to another patient.     Multiple missed appointments may lead to dismissal from the clinic.

## 2020-12-15 DIAGNOSIS — R52 ACHES: Primary | ICD-10-CM

## 2020-12-15 DIAGNOSIS — M79.10 MYALGIA: Primary | ICD-10-CM

## 2020-12-15 PROCEDURE — U0003 INFECTIOUS AGENT DETECTION BY NUCLEIC ACID (DNA OR RNA); SEVERE ACUTE RESPIRATORY SYNDROME CORONAVIRUS 2 (SARS-COV-2) (CORONAVIRUS DISEASE [COVID-19]), AMPLIFIED PROBE TECHNIQUE, MAKING USE OF HIGH THROUGHPUT TECHNOLOGIES AS DESCRIBED BY CMS-2020-01-R: HCPCS | Performed by: INTERNAL MEDICINE

## 2020-12-17 LAB
SARS-COV-2 RNA SPEC QL NAA+PROBE: NOT DETECTED
SPECIMEN SOURCE: NORMAL

## 2020-12-23 DIAGNOSIS — G43.811 OTHER MIGRAINE WITH STATUS MIGRAINOSUS, INTRACTABLE: ICD-10-CM

## 2020-12-23 RX ORDER — PREDNISONE 20 MG/1
TABLET ORAL
Qty: 20 TABLET | Refills: 0 | Status: SHIPPED | OUTPATIENT
Start: 2020-12-23 | End: 2021-02-23

## 2020-12-23 RX ORDER — OXYCODONE HYDROCHLORIDE 5 MG/1
5 TABLET ORAL EVERY 6 HOURS PRN
Qty: 30 TABLET | Refills: 0 | Status: SHIPPED | OUTPATIENT
Start: 2020-12-23 | End: 2021-03-04

## 2021-01-15 ENCOUNTER — HEALTH MAINTENANCE LETTER (OUTPATIENT)
Age: 61
End: 2021-01-15

## 2021-02-23 ENCOUNTER — TELEPHONE (OUTPATIENT)
Dept: FAMILY MEDICINE | Facility: CLINIC | Age: 61
End: 2021-02-23

## 2021-02-23 DIAGNOSIS — G43.911 INTRACTABLE MIGRAINE WITH STATUS MIGRAINOSUS, UNSPECIFIED MIGRAINE TYPE: Primary | ICD-10-CM

## 2021-02-23 RX ORDER — PREDNISONE 20 MG/1
TABLET ORAL
Qty: 20 TABLET | Refills: 0 | Status: SHIPPED | OUTPATIENT
Start: 2021-02-23 | End: 2021-03-04

## 2021-02-24 RX ORDER — OXYCODONE HYDROCHLORIDE 5 MG/1
5 TABLET ORAL EVERY 6 HOURS PRN
Qty: 12 TABLET | Refills: 0 | Status: SHIPPED | OUTPATIENT
Start: 2021-02-24 | End: 2021-02-27

## 2021-03-04 ENCOUNTER — VIRTUAL VISIT (OUTPATIENT)
Dept: FAMILY MEDICINE | Facility: CLINIC | Age: 61
End: 2021-03-04
Payer: COMMERCIAL

## 2021-03-04 DIAGNOSIS — G43.811 OTHER MIGRAINE WITH STATUS MIGRAINOSUS, INTRACTABLE: Primary | ICD-10-CM

## 2021-03-04 PROCEDURE — 99213 OFFICE O/P EST LOW 20 MIN: CPT | Mod: 95 | Performed by: FAMILY MEDICINE

## 2021-03-04 NOTE — PROGRESS NOTES
Yodit is a 60 year old who is being evaluated via a billable video visit.      How would you like to obtain your AVS? MyChart  If the video visit is dropped, the invitation should be resent by: Text to cell phone: [phone]  Will anyone else be joining your video visit? No      Video Start Time: 100p-changed to phone call lasting 10 minutes with video not working    Assessment & Plan     Other migraine with status migrainosus, intractable    Hx of chronic migraines- treated with Botox, Zomig, Topamax with Zofran, prednisone, Tramadol/oxycodone used for intractable migraines prn.  Recently had job change with intractable migraine missing work from 2- thru 2-.    Needs [facility] paperwork today for recent absence and for next 6 months with allowing 1-2 days/month of excused absence lasting 1-2 days prn for migraines.    20 minutes spent on the date of the encounter doing chart review, patient visit and documentation     Francine Funk MD  [facility]    Subjective   Yodit is a 60 year old who presents for the following health issues     HPI     Hx of migraines- needs FMLA paperwork        Review of Systems   Constitutional, HEENT, cardiovascular, pulmonary, gi and gu systems are negative, except as otherwise noted.      Objective           Vitals:  No vitals were obtained today due to virtual visit.    Physical Exam   GENERAL: Healthy, alert and no distress  PSYCH: Mentation appears normal, affect normal/bright, judgement and insight intact, normal speech and appearance well-groomed.                Video-Visit Details    Type of service:  Video Visit    Video End Time:110p    Originating Location (pt. Location): Home    Distant Location (provider location):  [facility]     Platform used for Video Visit: Moat

## 2021-03-11 ENCOUNTER — OFFICE VISIT (OUTPATIENT)
Dept: PALLIATIVE MEDICINE | Facility: CLINIC | Age: 61
End: 2021-03-11
Payer: COMMERCIAL

## 2021-03-11 VITALS — OXYGEN SATURATION: 100 % | SYSTOLIC BLOOD PRESSURE: 116 MMHG | DIASTOLIC BLOOD PRESSURE: 71 MMHG | HEART RATE: 96 BPM

## 2021-03-11 DIAGNOSIS — G43.811 OTHER MIGRAINE WITH STATUS MIGRAINOSUS, INTRACTABLE: Primary | ICD-10-CM

## 2021-03-11 PROCEDURE — 64615 CHEMODENERV MUSC MIGRAINE: CPT | Performed by: ANESTHESIOLOGY

## 2021-03-11 ASSESSMENT — PAIN SCALES - GENERAL: PAINLEVEL: MILD PAIN (3)

## 2021-03-11 NOTE — PROGRESS NOTES
Procedure note for Botox:  Pre procedure Diagnosis: Chronic Migraine     Post procedure Diagnosis: Same  Procedure performed: Botox Injections  Anesthesia: none  Complications: none  Operators: Tomeka Veronica MD    Indications:    Yodit Abraham is a 60 year old female who presents to clinic today for botox injections. She was referred by her PCP.  She was previously getting botox injections at [facility] but has changed her insurance to [place].  She has a history of chronic migraine headaches, more than 14 days/month.  Exam shows tenderness over the occipital and temporal muscles and they have tried conservative treatment including multiple headache medications and physical therapy.     Options/alternatives, benefits and risks were discussed with the patient including bleeding, infection, pneumothorax, weakness, and headache flare.   Questions were answered and she agrees to proceed. Voluntary informed consent was obtained and signed.     Response to previous Botox treatment:   Last Botox Date: 11/12/2020 & 12/10/2019 Dr. Romero -  neurology clinic  Total Unit: 155U    1. Headache frequency: Without botox she has a headache frequency of 15 headache days per month. This is compared to 3 headache days per month with botox injections.      2. Headache duration during this injection cycle: Headache duration has decreased.  Previously headaches lasted 48 hours and now they are average 12-24 hours.      3. Headache intensity during this injection cycle:    7/10 = Typical pain level   8/10 = Worst pain level   0/10 = Lowest pain level     4. Change in headache medication usage: Emgality, Zomig 5mg PRN, Topamax 100mg at bedtime, Effexor 150mg at bedtime, Wellbutrin 450mg daily     5. ER Visits During This Injection Cycle: NONE     6. Functional Performance: Change in ADL's, social interaction, days lost from work, etc. Patient reports being able to more fully participate in social and family activities and  responsibilities as headache symptoms have improved.    Vitals were reviewed: Yes  Allergies were reviewed:  Yes    Medications were reviewed:  Yes       Procedure:  After getting informed consent, a Pause for the Cause was performed.  Patient was prepped and draped with chloroprep.    A 27 gauge needle was used to make the injections.  After negative aspiration, botox was injected bilaterally into the following locations:    Procerus- 5 units (1 site)  Frontals- 20 units (4 sites)   -10 units (2 sites)  Temporalis- 40 units (8 sites)  Occipitis- 30 units (6 sites)  Cervical paraspinals- 20 units (4 sites).  Upper Trapezius- 30 units (6 sites)     Bilateral TMJ - 40 units (2 sites)          Hemostasis was achieved.    Total units used: 195  Total units wasted: 5  Botox lot numbers and Expiration dates:  [id number]  EXP 10/2023      Bandaids were placed when appropriate.  The patient tolerated the procedure well.    Follow-up includes:    -f/u phone call in one week  -can be repeated after 3 full months have passed.      ANNABEL ZEPEDA MD   Pain Management & Addiction Medicine

## 2021-03-11 NOTE — PATIENT INSTRUCTIONS
[facility]   Botox Injection Discharge Instructions    Do not rub or put extended pressure on the injection sites. You may gently touch the sites to remove any excess blood.    Monitor the injection sites for signs and symptoms of infection such as redness, swelling, warmth, fever, chills, or drainage to areas.    You may have soreness at the injection sites for up to 24 hours.    If you are able to use anti-inflammatory medications or Tylenol for pain control, you can take these as directed.    It may take up to 1 month to notice benefit from the 1st treatment    If you do notice relief, botox can be done every 3 months.  It may require insurance authorization everytime.      For questions about insurance coverage, please call the main clinic number and ask to speak with someone about botox coverage.     Pain Clinic phone number during work hours Monday-Friday:  [phone]    After hours provider line: [phone]

## 2021-06-15 DIAGNOSIS — G43.911 INTRACTABLE MIGRAINE WITH STATUS MIGRAINOSUS, UNSPECIFIED MIGRAINE TYPE: ICD-10-CM

## 2021-06-15 DIAGNOSIS — G43.811 OTHER MIGRAINE WITH STATUS MIGRAINOSUS, INTRACTABLE: ICD-10-CM

## 2021-06-15 RX ORDER — OXYCODONE HYDROCHLORIDE 5 MG/1
5 TABLET ORAL EVERY 6 HOURS PRN
Qty: 30 TABLET | Refills: 0 | Status: SHIPPED | OUTPATIENT
Start: 2021-06-15

## 2021-06-15 RX ORDER — PREDNISONE 20 MG/1
TABLET ORAL
Qty: 20 TABLET | Refills: 0 | Status: SHIPPED | OUTPATIENT
Start: 2021-06-15

## 2021-07-07 NOTE — PROGRESS NOTES
Procedure note for Botox:  Pre procedure Diagnosis: Chronic Migraine     Post procedure Diagnosis: Same  Procedure performed: Botox Injections  Anesthesia: none  Complications: none  Operators: Tomeka Veronica MD    Indications:    Yodit Abraham is a 60 year old female who presents to clinic today for botox injections. She was referred by her PCP.  She was previously getting botox injections at [facility] but has changed her insurance to [place].  She has a history of chronic migraine headaches, more than 14 days/month.  Exam shows tenderness over the occipital and temporal muscles and they have tried conservative treatment including multiple headache medications and physical therapy.     Options/alternatives, benefits and risks were discussed with the patient including bleeding, infection, pneumothorax, weakness, and headache flare.   Questions were answered and she agrees to proceed. Voluntary informed consent was obtained and signed.     Response to previous Botox treatment:   Last Botox Date: 3/11/2021    1. Headache frequency: Without botox she has a headache frequency of 15 headache days per month. This is compared to 3 headache days per month with botox injections.      2. Headache duration during this injection cycle: Headache duration has decreased.  Previously headaches lasted 48 hours and now they are average 12-24 hours.      3. Headache intensity during this injection cycle:    7/10 = Typical pain level   8/10 = Worst pain level   0/10 = Lowest pain level     4. Change in headache medication usage: Emgality, Zomig 5mg PRN, Topamax 100mg at bedtime, Effexor 150mg at bedtime, Wellbutrin 450mg daily     5. ER Visits During This Injection Cycle: NONE     6. Functional Performance: Change in ADL's, social interaction, days lost from work, etc. Patient reports being able to more fully participate in social and family activities and responsibilities as headache symptoms have improved.    Vitals were reviewed:  Yes  Allergies were reviewed:  Yes    Medications were reviewed:  Yes       Procedure:  After getting informed consent, a Pause for the Cause was performed.  Patient was prepped and draped with chloroprep.    A 27 gauge needle was used to make the injections.  After negative aspiration, botox was injected bilaterally into the following locations:    Procerus- 5 units (1 site)  Frontals- 20 units (4 sites)   -10 units (2 sites)  Temporalis- 40 units (8 sites)  Occipitis- 30 units (6 sites)  Cervical paraspinals- 20 units (4 sites).  Upper Trapezius- 30 units (6 sites)   Bilateral TMJ - 40 units (2 sites)          Hemostasis was achieved.    Total units used: 195  Total units wasted: 5  Botox lot numbers and Expiration dates:  [id number]  5/2022      Bandaids were placed when appropriate.  The patient tolerated the procedure well.    Follow-up includes:    -f/u phone call in one week  -can be repeated after 3 full months have passed.  October 6th @ 3:30PM - scheduled.       ANNABEL ZEPEDA MD   Pain Management & Addiction Medicine

## 2021-07-08 ENCOUNTER — OFFICE VISIT (OUTPATIENT)
Dept: PALLIATIVE MEDICINE | Facility: CLINIC | Age: 61
End: 2021-07-08
Payer: COMMERCIAL

## 2021-07-08 VITALS — OXYGEN SATURATION: 98 % | HEART RATE: 98 BPM | DIASTOLIC BLOOD PRESSURE: 78 MMHG | SYSTOLIC BLOOD PRESSURE: 116 MMHG

## 2021-07-08 DIAGNOSIS — G43.811 OTHER MIGRAINE WITH STATUS MIGRAINOSUS, INTRACTABLE: Primary | ICD-10-CM

## 2021-07-08 PROCEDURE — 64615 CHEMODENERV MUSC MIGRAINE: CPT | Performed by: ANESTHESIOLOGY

## 2021-07-08 ASSESSMENT — PAIN SCALES - GENERAL: PAINLEVEL: NO PAIN (0)

## 2021-07-08 NOTE — PATIENT INSTRUCTIONS
[facility]   Botox Injection Discharge Instructions    Do not rub or put extended pressure on the injection sites. You may gently touch the sites to remove any excess blood.    Monitor the injection sites for signs and symptoms of infection such as redness, swelling, warmth, fever, chills, or drainage to areas.    You may have soreness at the injection sites for up to 24 hours.    If you are able to use anti-inflammatory medications or Tylenol for pain control, you can take these as directed.    It may take up to 1 month to notice benefit from the 1st treatment    If you do notice relief, botox can be done every 3 months.  It may require insurance authorization everytime.      For questions about insurance coverage, please call the main clinic number and ask to speak with someone about botox coverage.     Pain Clinic phone number during work hours Monday-Friday:  [phone]    After hours provider line: [phone]

## 2021-09-05 ENCOUNTER — HEALTH MAINTENANCE LETTER (OUTPATIENT)
Age: 61
End: 2021-09-05

## 2021-10-06 ENCOUNTER — OFFICE VISIT (OUTPATIENT)
Dept: PALLIATIVE MEDICINE | Facility: CLINIC | Age: 61
End: 2021-10-06
Payer: COMMERCIAL

## 2021-10-06 VITALS — OXYGEN SATURATION: 98 % | DIASTOLIC BLOOD PRESSURE: 78 MMHG | HEART RATE: 79 BPM | SYSTOLIC BLOOD PRESSURE: 132 MMHG

## 2021-10-06 DIAGNOSIS — G43.811 OTHER MIGRAINE WITH STATUS MIGRAINOSUS, INTRACTABLE: Primary | ICD-10-CM

## 2021-10-06 PROCEDURE — 64615 CHEMODENERV MUSC MIGRAINE: CPT | Mod: 50 | Performed by: ANESTHESIOLOGY

## 2021-10-06 ASSESSMENT — PAIN SCALES - GENERAL: PAINLEVEL: MILD PAIN (3)

## 2021-10-06 NOTE — PATIENT INSTRUCTIONS
[facility]   Botox Injection Discharge Instructions    Do not rub or put extended pressure on the injection sites. You may gently touch the sites to remove any excess blood.    Monitor the injection sites for signs and symptoms of infection such as redness, swelling, warmth, fever, chills, or drainage to areas.    You may have soreness at the injection sites for up to 24 hours.    If you are able to use anti-inflammatory medications or Tylenol for pain control, you can take these as directed.    It may take up to 1 month to notice benefit from the 1st treatment    If you do notice relief, botox can be done every 3 months.  It may require insurance authorization everytime.      For questions about insurance coverage, please call the main clinic number and ask to speak with someone about botox coverage.     Pain Clinic phone number during work hours Monday-Friday:  [phone]    After hours provider line: [phone]

## 2021-10-06 NOTE — PROGRESS NOTES
Procedure note for Botox:  Pre procedure Diagnosis: Chronic Migraine     Post procedure Diagnosis: Same  Procedure performed: Botox Injections  Anesthesia: none  Complications: none  Operators: Tomeka Veronica MD    Indications:    Yodit Abraham is a 60 year old female who presents to clinic today for botox injections. She was referred by her PCP.  She was previously getting botox injections at [facility] but has changed her insurance to [place].  She has a history of chronic migraine headaches, more than 14 days/month.  Exam shows tenderness over the occipital and temporal muscles and they have tried conservative treatment including multiple headache medications and physical therapy.     Options/alternatives, benefits and risks were discussed with the patient including bleeding, infection, pneumothorax, weakness, and headache flare.   Questions were answered and she agrees to proceed. Voluntary informed consent was obtained and signed.     Response to previous Botox treatment:   Last Botox Date: 7/08/2021    1. Headache frequency: Without botox she has a headache frequency of 15 headache days per month. This is compared to 3 headache days per month with botox injections.      2. Headache duration during this injection cycle: Headache duration has decreased.  Previously headaches lasted 48 hours and now they are average 12-24 hours.      3. Headache intensity during this injection cycle:    7/10 = Typical pain level   8/10 = Worst pain level   0/10 = Lowest pain level     4. Change in headache medication usage: Emgality, Zomig 5mg PRN, Topamax 100mg at bedtime, Effexor 150mg at bedtime, Wellbutrin 450mg daily     5. ER Visits During This Injection Cycle: NONE     6. Functional Performance: Change in ADL's, social interaction, days lost from work, etc. Patient reports being able to more fully participate in social and family activities and responsibilities as headache symptoms have improved.    Vitals were reviewed:  Yes  Allergies were reviewed:  Yes    Medications were reviewed:  Yes       Procedure:  After getting informed consent, a Pause for the Cause was performed.  Patient was prepped and draped with chloroprep.    A 27 gauge needle was used to make the injections.  After negative aspiration, botox was injected bilaterally into the following locations:    Procerus- 5 units (1 site)  Frontals- 20 units (4 sites)   -10 units (2 sites)  Temporalis- 40 units (8 sites)  Occipitis- 30 units (6 sites)  Cervical paraspinals- 20 units (4 sites).  Upper Trapezius- 30 units (6 sites)             Hemostasis was achieved.    Total units used: 155  Total units wasted: 45  Botox lot numbers and Expiration dates:  See MAR      Bandaids were placed when appropriate.  The patient tolerated the procedure well.    Follow-up includes:    -f/u phone call in one week  -can be repeated after 3 full months have passed.      ANNABEL ZEPEDA MD   Pain Management & Addiction Medicine

## 2021-11-01 ENCOUNTER — APPOINTMENT (OUTPATIENT)
Dept: URGENT CARE | Facility: CLINIC | Age: 61
End: 2021-11-01
Payer: COMMERCIAL

## 2021-12-27 DIAGNOSIS — R42 VERTIGO: ICD-10-CM

## 2021-12-27 RX ORDER — MECLIZINE HYDROCHLORIDE 25 MG/1
TABLET ORAL
Qty: 21 TABLET | Refills: 0 | Status: SHIPPED | OUTPATIENT
Start: 2021-12-27

## 2021-12-27 NOTE — TELEPHONE ENCOUNTER
1 refill approved needs follow up visit, please call and schedule visit.    thank you    Antivertigo/Antiemetic Agents Passed 12/27/2021 02:30 PM   Protocol Details  Recent (12 mo) or future (30 days) visit within the authorizing provider's specialty    Medication is active on med list    Patient is 18 years of age or older

## 2022-02-19 ENCOUNTER — HEALTH MAINTENANCE LETTER (OUTPATIENT)
Age: 62
End: 2022-02-19

## 2022-10-22 ENCOUNTER — HEALTH MAINTENANCE LETTER (OUTPATIENT)
Age: 62
End: 2022-10-22

## 2023-11-05 ENCOUNTER — HEALTH MAINTENANCE LETTER (OUTPATIENT)
Age: 63
End: 2023-11-05